# Patient Record
Sex: MALE | Race: WHITE | NOT HISPANIC OR LATINO | URBAN - METROPOLITAN AREA
[De-identification: names, ages, dates, MRNs, and addresses within clinical notes are randomized per-mention and may not be internally consistent; named-entity substitution may affect disease eponyms.]

---

## 2021-01-14 ENCOUNTER — APPOINTMENT (RX ONLY)
Dept: URBAN - METROPOLITAN AREA CLINIC 154 | Facility: CLINIC | Age: 31
Setting detail: DERMATOLOGY
End: 2021-01-14

## 2021-01-14 DIAGNOSIS — L30.9 DERMATITIS, UNSPECIFIED: ICD-10-CM | Status: WORSENING

## 2021-01-14 DIAGNOSIS — L29.8 OTHER PRURITUS: ICD-10-CM | Status: WORSENING

## 2021-01-14 DIAGNOSIS — L29.89 OTHER PRURITUS: ICD-10-CM | Status: WORSENING

## 2021-01-14 PROCEDURE — ? PRESCRIPTION

## 2021-01-14 PROCEDURE — ? ADDITIONAL NOTES

## 2021-01-14 PROCEDURE — 99204 OFFICE O/P NEW MOD 45 MIN: CPT

## 2021-01-14 PROCEDURE — ? COUNSELING

## 2021-01-14 PROCEDURE — ? FULL BODY SKIN EXAM - DECLINED

## 2021-01-14 RX ORDER — CALCIPOTRIENE 50 UG/G
CREAM TOPICAL QD
Qty: 1 | Refills: 0 | Status: ERX | COMMUNITY
Start: 2021-01-14

## 2021-01-14 RX ORDER — TRIAMCINOLONE ACETONIDE 1 MG/G
CREAM TOPICAL BID
Qty: 1 | Refills: 2 | Status: ERX | COMMUNITY
Start: 2021-01-14

## 2021-01-14 RX ORDER — SULFACETAMIDE SODIUM AND SULFUR 10; 5 MG/G; MG/G
RINSE TOPICAL BID
Qty: 1 | Refills: 3 | Status: ERX | COMMUNITY
Start: 2021-01-14

## 2021-01-14 RX ADMIN — SULFACETAMIDE SODIUM AND SULFUR: 10; 5 RINSE TOPICAL at 00:00

## 2021-01-14 RX ADMIN — CALCIPOTRIENE: 50 CREAM TOPICAL at 00:00

## 2021-01-14 RX ADMIN — TRIAMCINOLONE ACETONIDE: 1 CREAM TOPICAL at 00:00

## 2021-01-14 ASSESSMENT — LOCATION SIMPLE DESCRIPTION DERM
LOCATION SIMPLE: LEFT HAND
LOCATION SIMPLE: RIGHT PLANTAR SURFACE
LOCATION SIMPLE: LEFT PLANTAR SURFACE

## 2021-01-14 ASSESSMENT — LOCATION DETAILED DESCRIPTION DERM
LOCATION DETAILED: RIGHT MEDIAL PLANTAR MIDFOOT
LOCATION DETAILED: LEFT RADIAL PALM
LOCATION DETAILED: LEFT MEDIAL PLANTAR MIDFOOT

## 2021-01-14 ASSESSMENT — LOCATION ZONE DERM
LOCATION ZONE: FEET
LOCATION ZONE: HAND

## 2021-02-08 ENCOUNTER — APPOINTMENT (RX ONLY)
Dept: URBAN - METROPOLITAN AREA CLINIC 154 | Facility: CLINIC | Age: 31
Setting detail: DERMATOLOGY
End: 2021-02-08

## 2021-02-08 DIAGNOSIS — L40.0 PSORIASIS VULGARIS: ICD-10-CM | Status: WORSENING

## 2021-02-08 DIAGNOSIS — L29.8 OTHER PRURITUS: ICD-10-CM | Status: UNCHANGED

## 2021-02-08 DIAGNOSIS — L29.89 OTHER PRURITUS: ICD-10-CM | Status: UNCHANGED

## 2021-02-08 DIAGNOSIS — L30.9 DERMATITIS, UNSPECIFIED: ICD-10-CM | Status: UNCHANGED

## 2021-02-08 PROCEDURE — ? COUNSELING

## 2021-02-08 PROCEDURE — ? FULL BODY SKIN EXAM - DECLINED

## 2021-02-08 PROCEDURE — 99213 OFFICE O/P EST LOW 20 MIN: CPT

## 2021-02-08 PROCEDURE — ? ADDITIONAL NOTES

## 2021-02-08 ASSESSMENT — LOCATION DETAILED DESCRIPTION DERM
LOCATION DETAILED: RIGHT CENTRAL MALAR CHEEK
LOCATION DETAILED: RIGHT LATERAL DORSAL FOOT
LOCATION DETAILED: LEFT MEDIAL PLANTAR MIDFOOT
LOCATION DETAILED: LEFT MEDIAL MALAR CHEEK
LOCATION DETAILED: LEFT RADIAL PALM
LOCATION DETAILED: RIGHT MEDIAL PLANTAR MIDFOOT
LOCATION DETAILED: LEFT LATERAL PLANTAR FOOT

## 2021-02-08 ASSESSMENT — LOCATION SIMPLE DESCRIPTION DERM
LOCATION SIMPLE: LEFT HAND
LOCATION SIMPLE: RIGHT CHEEK
LOCATION SIMPLE: RIGHT PLANTAR SURFACE
LOCATION SIMPLE: LEFT PLANTAR SURFACE
LOCATION SIMPLE: LEFT CHEEK
LOCATION SIMPLE: LEFT FOOT
LOCATION SIMPLE: RIGHT FOOT

## 2021-02-08 ASSESSMENT — LOCATION ZONE DERM
LOCATION ZONE: FEET
LOCATION ZONE: FACE
LOCATION ZONE: HAND

## 2021-03-19 ENCOUNTER — APPOINTMENT (RX ONLY)
Dept: URBAN - METROPOLITAN AREA CLINIC 154 | Facility: CLINIC | Age: 31
Setting detail: DERMATOLOGY
End: 2021-03-19

## 2021-03-19 DIAGNOSIS — L40.0 PSORIASIS VULGARIS: ICD-10-CM | Status: UNCHANGED

## 2021-03-19 DIAGNOSIS — L29.8 OTHER PRURITUS: ICD-10-CM | Status: UNCHANGED

## 2021-03-19 DIAGNOSIS — L29.89 OTHER PRURITUS: ICD-10-CM | Status: UNCHANGED

## 2021-03-19 PROBLEM — L30.9 DERMATITIS, UNSPECIFIED: Status: ACTIVE | Noted: 2021-03-19

## 2021-03-19 PROCEDURE — ? COUNSELING

## 2021-03-19 PROCEDURE — ? PRESCRIPTION

## 2021-03-19 PROCEDURE — 99213 OFFICE O/P EST LOW 20 MIN: CPT

## 2021-03-19 PROCEDURE — ? FULL BODY SKIN EXAM - DECLINED

## 2021-03-19 PROCEDURE — ? ADDITIONAL NOTES

## 2021-03-19 PROCEDURE — ? TREATMENT REGIMEN

## 2021-03-19 RX ORDER — CLOBETASOL PROPIONATE 0.5 MG/G
OINTMENT TOPICAL
Qty: 1 | Refills: 1 | Status: ERX | COMMUNITY
Start: 2021-03-19

## 2021-03-19 RX ORDER — HYDROCORTISONE BUTYRATE 1 MG/G
CREAM TOPICAL BID
Qty: 1 | Refills: 0 | Status: ERX | COMMUNITY
Start: 2021-03-19

## 2021-03-19 RX ADMIN — CLOBETASOL PROPIONATE: 0.5 OINTMENT TOPICAL at 00:00

## 2021-03-19 RX ADMIN — HYDROCORTISONE BUTYRATE: 1 CREAM TOPICAL at 00:00

## 2021-03-19 ASSESSMENT — LOCATION ZONE DERM
LOCATION ZONE: FEET
LOCATION ZONE: HAND
LOCATION ZONE: HAND

## 2021-03-19 ASSESSMENT — LOCATION SIMPLE DESCRIPTION DERM
LOCATION SIMPLE: RIGHT PLANTAR SURFACE
LOCATION SIMPLE: LEFT PLANTAR SURFACE
LOCATION SIMPLE: LEFT HAND
LOCATION SIMPLE: LEFT FOOT
LOCATION SIMPLE: RIGHT FOOT
LOCATION SIMPLE: LEFT HAND
LOCATION SIMPLE: RIGHT HAND

## 2021-03-19 ASSESSMENT — LOCATION DETAILED DESCRIPTION DERM
LOCATION DETAILED: RIGHT LATERAL DORSAL FOOT
LOCATION DETAILED: LEFT THENAR EMINENCE
LOCATION DETAILED: LEFT LATERAL PLANTAR FOOT
LOCATION DETAILED: RIGHT RADIAL PALM
LOCATION DETAILED: LEFT RADIAL PALM
LOCATION DETAILED: LEFT MEDIAL PLANTAR MIDFOOT
LOCATION DETAILED: RIGHT MEDIAL PLANTAR MIDFOOT

## 2021-03-19 NOTE — PROCEDURE: TREATMENT REGIMEN
Discontinue Regimen: Washcloth and soap
Action 3: Continue
Otc Regimen: Aveeno restoration cleanser
Detail Level: Zone

## 2021-06-09 ENCOUNTER — APPOINTMENT (RX ONLY)
Dept: URBAN - METROPOLITAN AREA CLINIC 154 | Facility: CLINIC | Age: 31
Setting detail: DERMATOLOGY
End: 2021-06-09

## 2021-06-09 DIAGNOSIS — L30.9 DERMATITIS, UNSPECIFIED: ICD-10-CM | Status: INADEQUATELY CONTROLLED

## 2021-06-09 DIAGNOSIS — M25.50 PAIN IN UNSPECIFIED JOINT: ICD-10-CM | Status: INADEQUATELY CONTROLLED

## 2021-06-09 DIAGNOSIS — L29.89 OTHER PRURITUS: ICD-10-CM | Status: STABLE

## 2021-06-09 DIAGNOSIS — L29.8 OTHER PRURITUS: ICD-10-CM | Status: STABLE

## 2021-06-09 PROCEDURE — ? FULL BODY SKIN EXAM - DECLINED

## 2021-06-09 PROCEDURE — ? TREATMENT REGIMEN

## 2021-06-09 PROCEDURE — ? ADDITIONAL NOTES

## 2021-06-09 PROCEDURE — ? REFERRAL

## 2021-06-09 PROCEDURE — ? COUNSELING

## 2021-06-09 PROCEDURE — ? PRESCRIPTION

## 2021-06-09 PROCEDURE — 99213 OFFICE O/P EST LOW 20 MIN: CPT

## 2021-06-09 RX ORDER — CRISABOROLE 20 MG/G
OINTMENT TOPICAL
Qty: 1 | Refills: 3 | Status: ERX | COMMUNITY
Start: 2021-06-09

## 2021-06-09 RX ORDER — CALCIPOTRIENE AND BETAMETHASONE DIPROPIONATE 50; 64 UG/G; MG/G
CREAM TOPICAL
Qty: 1 | Refills: 3 | Status: CANCELLED | COMMUNITY
Start: 2021-06-09

## 2021-06-09 RX ORDER — TRIAMCINOLONE ACETONIDE 1 MG/G
CREAM TOPICAL BID
Qty: 1 | Refills: 2 | Status: ERX

## 2021-06-09 RX ADMIN — CRISABOROLE: 20 OINTMENT TOPICAL at 00:00

## 2021-06-09 RX ADMIN — CALCIPOTRIENE AND BETAMETHASONE DIPROPIONATE: 50; 64 CREAM TOPICAL at 00:00

## 2021-06-09 ASSESSMENT — LOCATION SIMPLE DESCRIPTION DERM
LOCATION SIMPLE: RIGHT HAND
LOCATION SIMPLE: LEFT HAND
LOCATION SIMPLE: RIGHT PLANTAR SURFACE
LOCATION SIMPLE: LEFT PLANTAR SURFACE

## 2021-06-09 ASSESSMENT — LOCATION DETAILED DESCRIPTION DERM
LOCATION DETAILED: RIGHT THENAR EMINENCE
LOCATION DETAILED: RIGHT MEDIAL PLANTAR MIDFOOT
LOCATION DETAILED: LEFT RADIAL PALM
LOCATION DETAILED: LEFT MEDIAL PLANTAR MIDFOOT

## 2021-06-09 ASSESSMENT — LOCATION ZONE DERM
LOCATION ZONE: HAND
LOCATION ZONE: FEET

## 2021-06-09 NOTE — PROCEDURE: TREATMENT REGIMEN
Otc Regimen: Aveeno eczema liquid cleanser, Cerave liquid hydrating cleanser, Cerave moisturizer.
Initiate Treatment: Eucrisa 2% ointment triamcinolone topical cream, betamethasone-calcipotriene
Detail Level: Zone
Plan: Apply triamcinolone to AA of hand and feet prn itching flare. Apply eucrisa bid every Monday, wensday, Friday on affected areas of body alternate betamethasone-calcipotriene bid  every Tuesday, Thursday and Sunday with with . Suggested pt consider using a doctor Amado shoe wider than the food
Discontinue Regimen: Washcloth and scented soaps on affected areas

## 2021-06-09 NOTE — PROCEDURE: ADDITIONAL NOTES
Additional Notes: Patient consent was obtained to proceed with the visit and recommended plan of care after discussion of all risks and benefits, including the risks of COVID-19 exposure.
Detail Level: Simple
Render Risk Assessment In Note?: no
Additional Notes: Refer to rheumatologist to further evaluation and management.

## 2021-06-18 ENCOUNTER — RX ONLY (OUTPATIENT)
Age: 31
Setting detail: RX ONLY
End: 2021-06-18

## 2021-06-18 RX ORDER — CALCIPOTRIENE AND BETAMETHASONE DIPROPIONATE 50; 64 UG/G; MG/G
CREAM TOPICAL
Qty: 1 | Refills: 3 | Status: ERX